# Patient Record
Sex: MALE | Employment: UNEMPLOYED | URBAN - METROPOLITAN AREA
[De-identification: names, ages, dates, MRNs, and addresses within clinical notes are randomized per-mention and may not be internally consistent; named-entity substitution may affect disease eponyms.]

---
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
## 2021-05-28 ENCOUNTER — HOSPITAL ENCOUNTER (OUTPATIENT)
Dept: GENERAL RADIOLOGY | Age: 21
Discharge: HOME | End: 2021-05-28
Payer: MEDICAID

## 2021-05-28 ENCOUNTER — TRANSCRIBE ORDER (OUTPATIENT)
Dept: REGISTRATION | Age: 21
End: 2021-05-28

## 2021-05-28 DIAGNOSIS — M25.562 LEFT KNEE PAIN: ICD-10-CM

## 2021-05-28 DIAGNOSIS — M25.562 LEFT KNEE PAIN: Primary | ICD-10-CM

## 2021-05-28 PROCEDURE — 73562 X-RAY EXAM OF KNEE 3: CPT

## 2023-09-16 NOTE — LETTER
[facility] 
[address] [place] [address] [address] [id number] [phone] Work/School Note Date: 11/6/2017 To Whom It May concern: 
 
Lennox Hale was seen and treated today in the emergency room by the following provider(s): 
Attending Provider: Tona Opitz, MD 
Physician Assistant: ANNMARIE Jack. Lennox Hale may return to school on [id number]. Sincerely, ANNMARIE Jack 
 
 
 

General
(4) walks frequently